# Patient Record
Sex: FEMALE | Race: WHITE | NOT HISPANIC OR LATINO | Employment: STUDENT | URBAN - METROPOLITAN AREA
[De-identification: names, ages, dates, MRNs, and addresses within clinical notes are randomized per-mention and may not be internally consistent; named-entity substitution may affect disease eponyms.]

---

## 2020-01-10 ENCOUNTER — OFFICE VISIT (OUTPATIENT)
Dept: URGENT CARE | Facility: CLINIC | Age: 20
End: 2020-01-10
Payer: COMMERCIAL

## 2020-01-10 VITALS
OXYGEN SATURATION: 99 % | TEMPERATURE: 99 F | SYSTOLIC BLOOD PRESSURE: 116 MMHG | HEIGHT: 64 IN | BODY MASS INDEX: 30.73 KG/M2 | DIASTOLIC BLOOD PRESSURE: 75 MMHG | HEART RATE: 88 BPM | WEIGHT: 180 LBS

## 2020-01-10 DIAGNOSIS — K12.1 STOMATITIS: ICD-10-CM

## 2020-01-10 DIAGNOSIS — B27.99 INFECTIOUS MONONUCLEOSIS, WITH OTHER COMPLICATION, INFECTIOUS MONONUCLEOSIS DUE TO UNSPECIFIED ORGANISM: Primary | ICD-10-CM

## 2020-01-10 DIAGNOSIS — J02.9 ACUTE PHARYNGITIS, UNSPECIFIED ETIOLOGY: ICD-10-CM

## 2020-01-10 PROCEDURE — 99203 PR OFFICE/OUTPT VISIT, NEW, LEVL III, 30-44 MIN: ICD-10-PCS | Mod: S$GLB,,, | Performed by: NURSE PRACTITIONER

## 2020-01-10 PROCEDURE — 99203 OFFICE O/P NEW LOW 30 MIN: CPT | Mod: S$GLB,,, | Performed by: NURSE PRACTITIONER

## 2020-01-10 NOTE — LETTER
January 10, 2020      [facility]  [address]  [facility] [place] [address]  Phone: [phone]  Fax: [phone]       Patient: Yoli Streeter   YOB: 2000  Date of Visit: 01/10/2020    To Whom It May Concern:    Sierra Streeter  was at [facility] on 01/10/2020. She may return to work/school on 1/16/20 with no restrictions. If you have any questions or concerns, or if I can be of further assistance, please do not hesitate to contact me.    Sincerely,    Daya Worthington NP

## 2020-01-11 NOTE — PROGRESS NOTES
"Subjective:       Patient ID: Yoli Streeter is a 19 y.o. female.    Vitals:  height is 5' 4" (1.626 m) and weight is 81.6 kg (180 lb). Her tympanic temperature is 98.6 °F (37 °C). Her blood pressure is 116/75 and her pulse is 88. Her oxygen saturation is 99%.     Chief Complaint: Mouth Lesions    20 y/o female presents with mouth pain and multiple oral ulcers.  Also reports bilateral ear pressure.  States she has been traveling and flying a lot which has contributed to her ear pain. Recently diagnosed with strep.  Mouth ulcers started 4-5 days ago. OTC oral gel provided temporary mild relief.  She continues to feel fatigued.     Mouth Lesions    The current episode started 3 to 5 days ago. The onset was gradual. The problem occurs rarely. The problem has been unchanged. The problem is moderate. Nothing relieves the symptoms. The symptoms are aggravated by eating. Associated symptoms include ear pain, mouth sores, sore throat and swollen glands. Pertinent negatives include no fever, no double vision, no abdominal pain, no diarrhea, no nausea, no vomiting, no congestion, no headaches, no muscle aches, no neck pain, no cough, no wheezing, no rash and no diaper rash. She has been experiencing a moderate sore throat. Neither side is more painful than the other. The sore throat is characterized by pain only. The ear pain is mild. There is pain in both ears. She has not been pulling at the affected ear. She has been behaving normally. She has been drinking less than usual and eating less than usual. Urine output has been normal. The last void occurred less than 6 hours ago.       Constitution: Negative for chills, fatigue and fever.   HENT: Positive for ear pain, mouth sores and sore throat. Negative for drooling and congestion.    Neck: Positive for painful lymph nodes. Negative for neck pain and neck stiffness.   Cardiovascular: Negative for chest pain and leg swelling.   Eyes: Negative for double vision and blurred " vision.   Respiratory: Negative for cough, shortness of breath and wheezing.    Gastrointestinal: Negative for abdominal pain, nausea, vomiting and diarrhea.   Genitourinary: Negative for dysuria, frequency, urgency and history of kidney stones.   Musculoskeletal: Negative for joint pain, joint swelling, muscle cramps and muscle ache.   Skin: Negative for color change, pale, rash and bruising.   Allergic/Immunologic: Negative for seasonal allergies.   Neurological: Negative for dizziness, history of vertigo, light-headedness, passing out and headaches.   Hematologic/Lymphatic: Positive for swollen lymph nodes.   Psychiatric/Behavioral: Negative for nervous/anxious, sleep disturbance and depression. The patient is not nervous/anxious.        Objective:      Physical Exam   Constitutional: She is oriented to person, place, and time. She appears well-developed and well-nourished. She is cooperative.  Non-toxic appearance. She does not have a sickly appearance. She does not appear ill. No distress.   HENT:   Head: Normocephalic and atraumatic.   Right Ear: Hearing, external ear and ear canal normal. Tympanic membrane is retracted. A middle ear effusion is present.   Left Ear: Hearing, external ear and ear canal normal. Tympanic membrane is retracted. A middle ear effusion is present.   Nose: Mucosal edema present. No rhinorrhea or nasal deformity. No epistaxis. Right sinus exhibits no maxillary sinus tenderness and no frontal sinus tenderness. Left sinus exhibits no maxillary sinus tenderness and no frontal sinus tenderness.   Mouth/Throat: Uvula is midline and mucous membranes are normal. Oral lesions present. No trismus in the jaw. Normal dentition. No uvula swelling. Posterior oropharyngeal edema and posterior oropharyngeal erythema present. No oropharyngeal exudate.   Eyes: Conjunctivae and lids are normal. No scleral icterus.   Neck: Trachea normal, full passive range of motion without pain and phonation normal.  Neck supple. No neck rigidity. No edema and no erythema present.   Cardiovascular: Normal rate, regular rhythm, normal heart sounds, intact distal pulses and normal pulses.   Pulmonary/Chest: Effort normal and breath sounds normal. No respiratory distress. She has no decreased breath sounds. She has no wheezes. She has no rhonchi. She has no rales.   Abdominal: Normal appearance.   Musculoskeletal: Normal range of motion. She exhibits no edema or deformity.   Lymphadenopathy:     She has cervical adenopathy.        Right cervical: Posterior cervical (greater on right) adenopathy present.        Left cervical: Posterior cervical adenopathy present.   Neurological: She is alert and oriented to person, place, and time. She exhibits normal muscle tone. Coordination normal.   Skin: Skin is warm, dry, intact, not diaphoretic and not pale.   Psychiatric: She has a normal mood and affect. Her speech is normal and behavior is normal. Judgment and thought content normal. Cognition and memory are normal.   Nursing note and vitals reviewed.        Assessment:       1. Infectious mononucleosis, with other complication, infectious mononucleosis due to unspecified organism    2. Acute pharyngitis, unspecified etiology    3. Stomatitis        Plan:         Infectious mononucleosis, with other complication, infectious mononucleosis due to unspecified organism  -     magic mouthwash diphen/antac/lidoc/nysta; Swish and spit 10 mLs 4 (four) times daily as needed.  Dispense: 280 mL; Refill: 0    Acute pharyngitis, unspecified etiology  -     magic mouthwash diphen/antac/lidoc/nysta; Swish and spit 10 mLs 4 (four) times daily as needed.  Dispense: 280 mL; Refill: 0    Stomatitis  -     magic mouthwash diphen/antac/lidoc/nysta; Swish and spit 10 mLs 4 (four) times daily as needed.  Dispense: 280 mL; Refill: 0      Patient Instructions     Mononucleosis  Mononucleosis (also called mono) is a contagious viral infection. Most infants and  children exposed to the virus get only mild flu-like symptoms or no symptoms at all. However, infection is usually more serious in teens and young adults. While the virus is active it causes symptoms and can spread to others. After symptoms subside, the virus stays in the body and eventually becomes inactive. Once you have one case of mono, you are unlikely to develop symptoms again.  The virus is usually spread by contact with saliva, often by kissing. It may also spread by breast milk, blood, or sexual contact. It takes about 4 to 6 weeks to develop symptoms after exposure.  Early symptoms include headache, nausea, tiredness and general muscle aching. This is followed by sore throat and fever. Lymph glands in the neck, under the arms, or in the groin may be swollen. Symptoms usually go away in about 1 to 2 months. But they can last up to four months.  If symptoms have been present less than 1 week or more than 3 weeks, the blood test used to diagnose this disease may be negative even though you have the illness.  In this case, other tests may be done.  Note: Taking the antibiotics ampicillin or amoxicillin during a mono infection may cause a skin rash. This is not serious and will fade in about one week. The cause is a reaction of the drug with the virus.  Note: Mono can cause your spleen to swell. The spleen is a fist-sized organ in the upper left abdomen that stores red blood cells. Injury to a swollen spleen can cause the spleen to rupture. This can cause life-threatening internal bleeding. To avoid this, do not play contact sports or perform strenuous activity for 8 weeks, or until cleared by your healthcare provider. A sharp blow could rupture a swollen spleen  Home care  · Rest in bed until the fever and weakness have gone away.  · Drink plenty of fluids, but avoid alcohol. Otherwise, you may eat a regular diet.  · Ask your healthcare provider about using over-the-counter medicines to treat symptoms such as  fever, pain, or an itchy rash.  · Over-the-counter throat lozenges may help soothe a sore throat. Gargling with warm salt water (1/2 teaspoon in 1 glass of warm water) may also be soothing to the throat.  · You may return to work or school after the fever goes away and you are feeling better. Continue to follow any activity restrictions you have been given.  Preventing spread of the virus  To limit the spread of the virus, avoid exposing others to your saliva for at least 6 months after your illness (no kissing or sharing utensils, drinking glasses, or toothbrushes).  Follow-up care  Follow up with your healthcare provider within 1 to 2 weeks or as advised by our staff to be sure that there are no complications. If symptoms of extreme fatigue and swollen glands last longer than 6 months, see your healthcare provider for further testing.  When to seek medical advice  Call your healthcare provider if any of the following occur:  · Excessive coughing  · Yellow skin or eyes  ·  Trouble swallowing  Call 911  Get emergency medical care if any of the following occur:  · Severe or worsening abdominal pain  · Trouble breathing  Date Last Reviewed: 9/25/2015  © 9547-8111 [employer]. [address] [place] [address]. All rights reserved. This information is not intended as a substitute for professional medical care. Always follow your healthcare professional's instructions.        Canker Sore    A canker sore (also called an aphthous ulcer) is a painful sore on the lining of the mouth. It is most painful during the first few days, and it lasts about 7 to 14 days before going away.  Causes  Canker sores are not cold sores or fever blisters. They are not contagious, so they are not spread by contact. The exact cause of canker sores is not clear, but there are a number of things that can trigger them in different people.  · Mild injury, such as biting the inside of the mouth, lip, or cheek, or dental  procedures  · Stress  · Poor diet, or lack of certain nutrients, including B vitamins and iron  · Foods that can irritate the mouth, including tomatoes, citrus fruits, and some nuts (foods that are acidic or contain bitter substances called tannins)  · Irritating chemicals, such as those in some toothpastes and mouthwashes  · Certain chronic illnesses  Symptoms  Canker sores are found on the lining of the mouth. They can be inside the cheeks or lips, on the roof of the mouth, at the base of the gums, on the tongue, or in the back of the throat. Canker sores typically have these characteristics:  · Small, flat (not raised) sores  · Can be white or yellowish bumps that are red around the edges or have a red halo  · Usually small in size, roundish, and in groups  · Accompanied by pain or burning  Canker sores do not leave a scar. But they usually come back.  Home care  The goals of canker sore treatment are to decrease the pain, speed healing, and prevent recurrence. No single treatment works for everyone. Try a number of techniques to see what works best.  General care  · You may find that soft, easy-to-chew foods cause less pain. Use a straw to direct liquids away from the sore.  · Use a soft-bristle toothbrush, and brush your teeth gently.  · Avoid acidic, salty, or spicy foods.  · Avoid injuring the inside of your mouth, or scraping your existing canker sores, by avoiding crusty and crunchy foods like french bread and chips.  Medicines  You can try over-the-counter medicines that cover the sores and numb them. This protects the sores while they heal and helps reduce pain.  Homemade rinses and solutions  You can use these solutions as mouth rinses. Spit them out after using them. You can also dab them on the sores. You can repeat these treatments as often as needed.  · Rinse your mouth with saltwater.  · Mix equal amounts of hydrogen peroxide and water. You can use it as a mouthwash or dab it on spots with a cotton  swab. You can also add sodium bicarbonate to this to make a paste, and then dab it on spots.  Follow-up care  Follow up with your healthcare provider, or as advised.  · If a culture was done, you will be notified if the treatment needs to be changed. You can call as directed for the results.  Call 911  Contact emergency services if any of these occur:  · Trouble breathing  · Inability to swallow  · Extreme drowsiness or trouble awakening  · Fainting or loss of consciousness  · Rapid heart rate  · Seizure  · Stiff neck  When to seek medical advice  Call your healthcare provider right away if any of these occur:  · You have a fever of 100.4°F (38°C) or higher.  · You are pregnant.  · You just had surgery or another medical procedure, or you were just discharged from the hospital.  · You are unable to eat or swallow due to pain.  Date Last Reviewed: 7/30/2015  © 8873-8534 [employer] [address] [place] [address]. All rights reserved. This information is not intended as a substitute for professional medical care. Always follow your healthcare professional's instructions.        Stomatitis (Child)  Stomatitis is pain inside the mouth. It can involve open sores (canker sores) or redness and swelling. It occurs on the inside of the cheeks or on the tongue or gums. Stomatitis is more common in children, but it can occur at any age.  Causes  There are many causes of stomatitis, but the most common is viral infections. Other common causes are:  · Injury or irritation of the mouth lining  · Fungal or bacterial infections  · Using tobacco  · Irritating foods or chemicals, such as citrus fruit, toothpaste, or mouthwash  · Lack of certain vitamins, including vitamins B and C  · A weakened immune system  Symptoms  Stomatitis can result in a variety of symptoms, including:  · Redness inside the mouth  · Sores (ulcers) in the mouth  · Pain or burning  · Swelling  · Fever  Treatment  For a viral infection,  usually only the symptoms are treated. Antibiotics do not kill viruses and are not recommended for this condition. This infection should go away within 7 to 10 days.  Home care  · Use a local numbing solution for pain relief. Ask the pharmacist for suggestions on which brand and strength is best for your child. You may apply this directly to the sores with a cotton swab or with your finger. Use the numbing solution just before meals if eating is a problem.  · Older children may rinse their mouth with warm saltwater (½ teaspoon of salt in 1 glass of warm water). Be certain they spit the rinse out and do not swallow it.  · Feed your child a soft diet, along with plenty of fluids to prevent dehydration. If your child doesn't want to eat solid foods, it's OK for a few days, as long as he or she drinks lots of fluids. Cool drinks and frozen treats (sherbet) are soothing. Avoid citrus juices (orange juice, lemonade, etc.) and salty or spicy foods, since these may cause more pain in the mouth.  · Follow your healthcare provider's instructions on the use of over-the-counter pain medications such as acetaminophen for fever, fussiness, or pain. In infants older than 6 months, you may use children's ibuprofen. (Note: If your child has chronic liver or kidney disease or has ever had a stomach ulcer or gastrointestinal bleeding, talk with your healthcare provider before using these medicines.) Aspirin should never be given to anyone younger than 18 years of age who is ill with a viral infection or fever. It may cause severe liver or brain damage.  · Children should stay home until their fever is gone and they are eating and drinking well.  Follow-up care  Follow up with your childs healthcare provider, or as advised.  · If a culture was done, you will be notified if the treatment needs to be changed. You can call as directed for the results.  Call 911, or get immediate medical care  Contact emergency services right away if any  of these occur:  · Trouble breathing  · Inability to swallow  · Extremes drowsiness or trouble awakening  · Fainting or loss of consciousness  · Rapid heart rate  · Seizure  · Stiff neck  When to seek medical advice  For a usually healthy child, call your child's healthcare provider right away if any of these occur:  · Your child is 3 months old or younger and has a fever of 100.4°F (38°C) or higher. Get medical care right away. Fever in a young baby can be a sign of a dangerous infection.  · Your child is of any age and has repeated fevers above 104°F (40°C).  · Your child is younger than 2 years of age and a fever of 100.4°F (38°C) continues for more than 1 day.  · Your child is 2 years old or older and a fever of 100.4°F (38°C) continues for more than 3 days.  · Your child is unable to eat or drink due to mouth pain.  · Your child shows unusual fussiness, drowsiness or confusion  · Your child shows symptoms of dehydration, including no wet diapers for 8 hours, no tears when crying, sunken eyes, or a dry mouth  Date Last Reviewed: 7/30/2015  © 5825-9619 [employer]. [address] [place] [address], [place] [address]. All rights reserved. This information is not intended as a substitute for professional medical care. Always follow your healthcare professional's instructions.      1.  Take all medications as directed. If you have been prescribed antibiotics, make sure to complete them.   2.  Rest and keep yourself/patient well hydrated. For adults, it is recommended to drink at least 8-10 glasses of water daily.   3.  For patients above 6 months of age who are not allergic to and are not on anticoagulants, you can alternate Tylenol and Motrin every 4-6 hours for fever above 100.4F and/or pain.  For patients less than 6 months of age, allergic to or intolerant to NSAIDS, have gastritis, gastric ulcers, or history of GI bleeds, are pregnant, or are on anticoagulant therapy, you can take Tylenol every 4 hours as  needed for fever above 100.4F and/or pain.   4. You should schedule a follow-up appointment with your Primary Care Provider/Pediatrician for recheck in 2-3 days or as directed at this visit.   5.  If your condition fails to improve in a timely manner, you should receive another evaluation by your Primary Care Provider/Pediatrician to discuss your concerns or return to urgent care for a recheck.  If your condition worsens at any time, you should report immediately to your nearest Emergency Department for further evaluation. **You must understand that you have received Urgent Care treatment only and that you may be released before all of your medical problems are known or treated. You, the patient, are responsible to arrange for follow-up care as instructed.

## 2020-01-11 NOTE — PATIENT INSTRUCTIONS
Mononucleosis  Mononucleosis (also called mono) is a contagious viral infection. Most infants and children exposed to the virus get only mild flu-like symptoms or no symptoms at all. However, infection is usually more serious in teens and young adults. While the virus is active it causes symptoms and can spread to others. After symptoms subside, the virus stays in the body and eventually becomes inactive. Once you have one case of mono, you are unlikely to develop symptoms again.  The virus is usually spread by contact with saliva, often by kissing. It may also spread by breast milk, blood, or sexual contact. It takes about 4 to 6 weeks to develop symptoms after exposure.  Early symptoms include headache, nausea, tiredness and general muscle aching. This is followed by sore throat and fever. Lymph glands in the neck, under the arms, or in the groin may be swollen. Symptoms usually go away in about 1 to 2 months. But they can last up to four months.  If symptoms have been present less than 1 week or more than 3 weeks, the blood test used to diagnose this disease may be negative even though you have the illness.  In this case, other tests may be done.  Note: Taking the antibiotics ampicillin or amoxicillin during a mono infection may cause a skin rash. This is not serious and will fade in about one week. The cause is a reaction of the drug with the virus.  Note: Mono can cause your spleen to swell. The spleen is a fist-sized organ in the upper left abdomen that stores red blood cells. Injury to a swollen spleen can cause the spleen to rupture. This can cause life-threatening internal bleeding. To avoid this, do not play contact sports or perform strenuous activity for 8 weeks, or until cleared by your healthcare provider. A sharp blow could rupture a swollen spleen  Home care  · Rest in bed until the fever and weakness have gone away.  · Drink plenty of fluids, but avoid alcohol. Otherwise, you may eat a regular  diet.  · Ask your healthcare provider about using over-the-counter medicines to treat symptoms such as fever, pain, or an itchy rash.  · Over-the-counter throat lozenges may help soothe a sore throat. Gargling with warm salt water (1/2 teaspoon in 1 glass of warm water) may also be soothing to the throat.  · You may return to work or school after the fever goes away and you are feeling better. Continue to follow any activity restrictions you have been given.  Preventing spread of the virus  To limit the spread of the virus, avoid exposing others to your saliva for at least 6 months after your illness (no kissing or sharing utensils, drinking glasses, or toothbrushes).  Follow-up care  Follow up with your healthcare provider within 1 to 2 weeks or as advised by our staff to be sure that there are no complications. If symptoms of extreme fatigue and swollen glands last longer than 6 months, see your healthcare provider for further testing.  When to seek medical advice  Call your healthcare provider if any of the following occur:  · Excessive coughing  · Yellow skin or eyes  ·  Trouble swallowing  Call 911  Get emergency medical care if any of the following occur:  · Severe or worsening abdominal pain  · Trouble breathing  Date Last Reviewed: 9/25/2015  © 1089-4485 [employer]. [address] [place] [address], [place] [address]. All rights reserved. This information is not intended as a substitute for professional medical care. Always follow your healthcare professional's instructions.        Canker Sore    A canker sore (also called an aphthous ulcer) is a painful sore on the lining of the mouth. It is most painful during the first few days, and it lasts about 7 to 14 days before going away.  Causes  Canker sores are not cold sores or fever blisters. They are not contagious, so they are not spread by contact. The exact cause of canker sores is not clear, but there are a number of things that can trigger them in  different people.  · Mild injury, such as biting the inside of the mouth, lip, or cheek, or dental procedures  · Stress  · Poor diet, or lack of certain nutrients, including B vitamins and iron  · Foods that can irritate the mouth, including tomatoes, citrus fruits, and some nuts (foods that are acidic or contain bitter substances called tannins)  · Irritating chemicals, such as those in some toothpastes and mouthwashes  · Certain chronic illnesses  Symptoms  Canker sores are found on the lining of the mouth. They can be inside the cheeks or lips, on the roof of the mouth, at the base of the gums, on the tongue, or in the back of the throat. Canker sores typically have these characteristics:  · Small, flat (not raised) sores  · Can be white or yellowish bumps that are red around the edges or have a red halo  · Usually small in size, roundish, and in groups  · Accompanied by pain or burning  Canker sores do not leave a scar. But they usually come back.  Home care  The goals of canker sore treatment are to decrease the pain, speed healing, and prevent recurrence. No single treatment works for everyone. Try a number of techniques to see what works best.  General care  · You may find that soft, easy-to-chew foods cause less pain. Use a straw to direct liquids away from the sore.  · Use a soft-bristle toothbrush, and brush your teeth gently.  · Avoid acidic, salty, or spicy foods.  · Avoid injuring the inside of your mouth, or scraping your existing canker sores, by avoiding crusty and crunchy foods like french bread and chips.  Medicines  You can try over-the-counter medicines that cover the sores and numb them. This protects the sores while they heal and helps reduce pain.  Homemade rinses and solutions  You can use these solutions as mouth rinses. Spit them out after using them. You can also dab them on the sores. You can repeat these treatments as often as needed.  · Rinse your mouth with saltwater.  · Mix equal  amounts of hydrogen peroxide and water. You can use it as a mouthwash or dab it on spots with a cotton swab. You can also add sodium bicarbonate to this to make a paste, and then dab it on spots.  Follow-up care  Follow up with your healthcare provider, or as advised.  · If a culture was done, you will be notified if the treatment needs to be changed. You can call as directed for the results.  Call 911  Contact emergency services if any of these occur:  · Trouble breathing  · Inability to swallow  · Extreme drowsiness or trouble awakening  · Fainting or loss of consciousness  · Rapid heart rate  · Seizure  · Stiff neck  When to seek medical advice  Call your healthcare provider right away if any of these occur:  · You have a fever of 100.4°F (38°C) or higher.  · You are pregnant.  · You just had surgery or another medical procedure, or you were just discharged from the hospital.  · You are unable to eat or swallow due to pain.  Date Last Reviewed: 7/30/2015  © 0153-0061 [employer]. [address] [place] [address]. All rights reserved. This information is not intended as a substitute for professional medical care. Always follow your healthcare professional's instructions.        Stomatitis (Child)  Stomatitis is pain inside the mouth. It can involve open sores (canker sores) or redness and swelling. It occurs on the inside of the cheeks or on the tongue or gums. Stomatitis is more common in children, but it can occur at any age.  Causes  There are many causes of stomatitis, but the most common is viral infections. Other common causes are:  · Injury or irritation of the mouth lining  · Fungal or bacterial infections  · Using tobacco  · Irritating foods or chemicals, such as citrus fruit, toothpaste, or mouthwash  · Lack of certain vitamins, including vitamins B and C  · A weakened immune system  Symptoms  Stomatitis can result in a variety of symptoms, including:  · Redness inside the  mouth  · Sores (ulcers) in the mouth  · Pain or burning  · Swelling  · Fever  Treatment  For a viral infection, usually only the symptoms are treated. Antibiotics do not kill viruses and are not recommended for this condition. This infection should go away within 7 to 10 days.  Home care  · Use a local numbing solution for pain relief. Ask the pharmacist for suggestions on which brand and strength is best for your child. You may apply this directly to the sores with a cotton swab or with your finger. Use the numbing solution just before meals if eating is a problem.  · Older children may rinse their mouth with warm saltwater (½ teaspoon of salt in 1 glass of warm water). Be certain they spit the rinse out and do not swallow it.  · Feed your child a soft diet, along with plenty of fluids to prevent dehydration. If your child doesn't want to eat solid foods, it's OK for a few days, as long as he or she drinks lots of fluids. Cool drinks and frozen treats (sherbet) are soothing. Avoid citrus juices (orange juice, lemonade, etc.) and salty or spicy foods, since these may cause more pain in the mouth.  · Follow your healthcare provider's instructions on the use of over-the-counter pain medications such as acetaminophen for fever, fussiness, or pain. In infants older than 6 months, you may use children's ibuprofen. (Note: If your child has chronic liver or kidney disease or has ever had a stomach ulcer or gastrointestinal bleeding, talk with your healthcare provider before using these medicines.) Aspirin should never be given to anyone younger than 18 years of age who is ill with a viral infection or fever. It may cause severe liver or brain damage.  · Children should stay home until their fever is gone and they are eating and drinking well.  Follow-up care  Follow up with your childs healthcare provider, or as advised.  · If a culture was done, you will be notified if the treatment needs to be changed. You can call as  directed for the results.  Call 911, or get immediate medical care  Contact emergency services right away if any of these occur:  · Trouble breathing  · Inability to swallow  · Extremes drowsiness or trouble awakening  · Fainting or loss of consciousness  · Rapid heart rate  · Seizure  · Stiff neck  When to seek medical advice  For a usually healthy child, call your child's healthcare provider right away if any of these occur:  · Your child is 3 months old or younger and has a fever of 100.4°F (38°C) or higher. Get medical care right away. Fever in a young baby can be a sign of a dangerous infection.  · Your child is of any age and has repeated fevers above 104°F (40°C).  · Your child is younger than 2 years of age and a fever of 100.4°F (38°C) continues for more than 1 day.  · Your child is 2 years old or older and a fever of 100.4°F (38°C) continues for more than 3 days.  · Your child is unable to eat or drink due to mouth pain.  · Your child shows unusual fussiness, drowsiness or confusion  · Your child shows symptoms of dehydration, including no wet diapers for 8 hours, no tears when crying, sunken eyes, or a dry mouth  Date Last Reviewed: 7/30/2015  © 9693-0845 [employer]. [address] [place] [address], [place] [address]. All rights reserved. This information is not intended as a substitute for professional medical care. Always follow your healthcare professional's instructions.      1.  Take all medications as directed. If you have been prescribed antibiotics, make sure to complete them.   2.  Rest and keep yourself/patient well hydrated. For adults, it is recommended to drink at least 8-10 glasses of water daily.   3.  For patients above 6 months of age who are not allergic to and are not on anticoagulants, you can alternate Tylenol and Motrin every 4-6 hours for fever above 100.4F and/or pain.  For patients less than 6 months of age, allergic to or intolerant to NSAIDS, have gastritis, gastric  ulcers, or history of GI bleeds, are pregnant, or are on anticoagulant therapy, you can take Tylenol every 4 hours as needed for fever above 100.4F and/or pain.   4. You should schedule a follow-up appointment with your Primary Care Provider/Pediatrician for recheck in 2-3 days or as directed at this visit.   5.  If your condition fails to improve in a timely manner, you should receive another evaluation by your Primary Care Provider/Pediatrician to discuss your concerns or return to urgent care for a recheck.  If your condition worsens at any time, you should report immediately to your nearest Emergency Department for further evaluation. **You must understand that you have received Urgent Care treatment only and that you may be released before all of your medical problems are known or treated. You, the patient, are responsible to arrange for follow-up care as instructed.

## 2021-04-09 ENCOUNTER — OFFICE VISIT (OUTPATIENT)
Dept: URGENT CARE | Facility: CLINIC | Age: 21
End: 2021-04-09
Payer: COMMERCIAL

## 2021-04-09 VITALS
BODY MASS INDEX: 30.12 KG/M2 | WEIGHT: 170 LBS | TEMPERATURE: 99 F | OXYGEN SATURATION: 100 % | HEIGHT: 63 IN | DIASTOLIC BLOOD PRESSURE: 75 MMHG | RESPIRATION RATE: 16 BRPM | SYSTOLIC BLOOD PRESSURE: 121 MMHG

## 2021-04-09 DIAGNOSIS — J06.9 UPPER RESPIRATORY TRACT INFECTION, UNSPECIFIED TYPE: ICD-10-CM

## 2021-04-09 DIAGNOSIS — Z20.822 CLOSE EXPOSURE TO COVID-19 VIRUS: Primary | ICD-10-CM

## 2021-04-09 LAB
CTP QC/QA: YES
SARS-COV-2 RDRP RESP QL NAA+PROBE: NEGATIVE

## 2021-04-09 PROCEDURE — 99213 OFFICE O/P EST LOW 20 MIN: CPT | Mod: S$GLB,CS,, | Performed by: INTERNAL MEDICINE

## 2021-04-09 PROCEDURE — 3008F PR BODY MASS INDEX (BMI) DOCUMENTED: ICD-10-PCS | Mod: CPTII,S$GLB,, | Performed by: INTERNAL MEDICINE

## 2021-04-09 PROCEDURE — U0002 COVID-19 LAB TEST NON-CDC: HCPCS | Mod: QW,S$GLB,, | Performed by: INTERNAL MEDICINE

## 2021-04-09 PROCEDURE — 1125F AMNT PAIN NOTED PAIN PRSNT: CPT | Mod: S$GLB,,, | Performed by: INTERNAL MEDICINE

## 2021-04-09 PROCEDURE — 1125F PR PAIN SEVERITY QUANTIFIED, PAIN PRESENT: ICD-10-PCS | Mod: S$GLB,,, | Performed by: INTERNAL MEDICINE

## 2021-04-09 PROCEDURE — U0002: ICD-10-PCS | Mod: QW,S$GLB,, | Performed by: INTERNAL MEDICINE

## 2021-04-09 PROCEDURE — 99213 PR OFFICE/OUTPT VISIT, EST, LEVL III, 20-29 MIN: ICD-10-PCS | Mod: S$GLB,CS,, | Performed by: INTERNAL MEDICINE

## 2021-04-09 PROCEDURE — 3008F BODY MASS INDEX DOCD: CPT | Mod: CPTII,S$GLB,, | Performed by: INTERNAL MEDICINE
